# Patient Record
Sex: FEMALE | Race: WHITE | NOT HISPANIC OR LATINO | Employment: FULL TIME | URBAN - METROPOLITAN AREA
[De-identification: names, ages, dates, MRNs, and addresses within clinical notes are randomized per-mention and may not be internally consistent; named-entity substitution may affect disease eponyms.]

---

## 2017-08-01 ENCOUNTER — OFFICE VISIT (OUTPATIENT)
Dept: URGENT CARE | Facility: PHYSICIAN GROUP | Age: 62
End: 2017-08-01
Payer: COMMERCIAL

## 2017-08-01 VITALS
HEART RATE: 77 BPM | DIASTOLIC BLOOD PRESSURE: 86 MMHG | RESPIRATION RATE: 16 BRPM | BODY MASS INDEX: 23.04 KG/M2 | HEIGHT: 63 IN | TEMPERATURE: 97.7 F | OXYGEN SATURATION: 97 % | SYSTOLIC BLOOD PRESSURE: 144 MMHG | WEIGHT: 130 LBS

## 2017-08-01 DIAGNOSIS — Z72.0 TOBACCO ABUSE: ICD-10-CM

## 2017-08-01 DIAGNOSIS — B02.9 HERPES ZOSTER WITHOUT COMPLICATION: ICD-10-CM

## 2017-08-01 PROCEDURE — 99406 BEHAV CHNG SMOKING 3-10 MIN: CPT | Performed by: PHYSICIAN ASSISTANT

## 2017-08-01 PROCEDURE — 99204 OFFICE O/P NEW MOD 45 MIN: CPT | Mod: 25 | Performed by: PHYSICIAN ASSISTANT

## 2017-08-01 RX ORDER — HYDROCODONE BITARTRATE AND ACETAMINOPHEN 5; 325 MG/1; MG/1
TABLET ORAL
Qty: 10 TAB | Refills: 0 | Status: SHIPPED | OUTPATIENT
Start: 2017-08-01

## 2017-08-01 RX ORDER — VALACYCLOVIR HYDROCHLORIDE 1 G/1
1000 TABLET, FILM COATED ORAL 3 TIMES DAILY
Qty: 21 TAB | Refills: 0 | Status: SHIPPED | OUTPATIENT
Start: 2017-08-01

## 2017-08-01 ASSESSMENT — ENCOUNTER SYMPTOMS
HEADACHES: 0
FEVER: 0
CHILLS: 0
NEUROLOGICAL NEGATIVE: 1
MUSCULOSKELETAL NEGATIVE: 1
RESPIRATORY NEGATIVE: 1
BRUISES/BLEEDS EASILY: 0
CARDIOVASCULAR NEGATIVE: 1

## 2017-08-01 NOTE — PATIENT INSTRUCTIONS
Shingles  Shingles, which is also known as herpes zoster, is an infection that causes a painful skin rash and fluid-filled blisters. Shingles is not related to genital herpes, which is a sexually transmitted infection.     Shingles only develops in people who:  · Have had chickenpox.  · Have received the chickenpox vaccine. (This is rare.)  CAUSES  Shingles is caused by varicella-zoster virus (VZV). This is the same virus that causes chickenpox. After exposure to VZV, the virus stays in the body in an inactive (dormant) state. Shingles develops if the virus reactivates. This can happen many years after the initial exposure to VZV. It is not known what causes this virus to reactivate.  RISK FACTORS  People who have had chickenpox or received the chickenpox vaccine are at risk for shingles. Infection is more common in people who:  · Are older than age 50.  · Have a weakened defense (immune) system, such as those with HIV, AIDS, or cancer.  · Are taking medicines that weaken the immune system, such as transplant medicines.  · Are under great stress.  SYMPTOMS  Early symptoms of this condition include itching, tingling, and pain in an area on your skin. Pain may be described as burning, stabbing, or throbbing.  A few days or weeks after symptoms start, a painful red rash appears, usually on one side of the body in a bandlike or beltlike pattern. The rash eventually turns into fluid-filled blisters that break open, scab over, and dry up in about 2-3 weeks.  At any time during the infection, you may also develop:  · A fever.  · Chills.  · A headache.  · An upset stomach.  DIAGNOSIS  This condition is diagnosed with a skin exam. Sometimes, skin or fluid samples are taken from the blisters before a diagnosis is made. These samples are examined under a microscope or sent to a lab for testing.  TREATMENT  There is no specific cure for this condition. Your health care provider will probably prescribe medicines to help you  manage pain, recover more quickly, and avoid long-term problems. Medicines may include:  · Antiviral drugs.  · Anti-inflammatory drugs.  · Pain medicines.  If the area involved is on your face, you may be referred to a specialist, such as an eye doctor (ophthalmologist) or an ear, nose, and throat (ENT) doctor to help you avoid eye problems, chronic pain, or disability.  HOME CARE INSTRUCTIONS  Medicines  · Take medicines only as directed by your health care provider.  · Apply an anti-itch or numbing cream to the affected area as directed by your health care provider.  Blister and Rash Care  · Take a cool bath or apply cool compresses to the area of the rash or blisters as directed by your health care provider. This may help with pain and itching.  · Keep your rash covered with a loose bandage (dressing). Wear loose-fitting clothing to help ease the pain of material rubbing against the rash.  · Keep your rash and blisters clean with mild soap and cool water or as directed by your health care provider.  · Check your rash every day for signs of infection. These include redness, swelling, and pain that lasts or increases.  · Do not pick your blisters.  · Do not scratch your rash.  General Instructions  · Rest as directed by your health care provider.  · Keep all follow-up visits as directed by your health care provider. This is important.  · Until your blisters scab over, your infection can cause chickenpox in people who have never had it or been vaccinated against it. To prevent this from happening, avoid contact with other people, especially:  ¨ Babies.  ¨ Pregnant women.  ¨ Children who have eczema.  ¨ Elderly people who have transplants.  ¨ People who have chronic illnesses, such as leukemia or AIDS.  SEEK MEDICAL CARE IF:  · Your pain is not relieved with prescribed medicines.  · Your pain does not get better after the rash heals.  · Your rash looks infected. Signs of infection include redness, swelling, and pain  that lasts or increases.  SEEK IMMEDIATE MEDICAL CARE IF:  · The rash is on your face or nose.  · You have facial pain, pain around your eye area, or loss of feeling on one side of your face.  · You have ear pain or you have ringing in your ear.  · You have loss of taste.  · Your condition gets worse.     This information is not intended to replace advice given to you by your health care provider. Make sure you discuss any questions you have with your health care provider.     Document Released: 12/18/2006 Document Revised: 01/08/2016 Document Reviewed: 10/29/2015  [employer] Interactive Patient Education ©2016 [employer]

## 2017-08-01 NOTE — Clinical Note
August 1, 2017         Patient: Ángela Weller   YOB: 1955   Date of Visit: 8/1/2017           To Whom it May Concern:    Ángela Weller was seen in my clinic on 8/1/2017. She is to be off today and tomorrow to recover    If you have any questions or concerns, please don't hesitate to call.        Sincerely,           Yesy Russ PA-C  Electronically Signed

## 2017-08-01 NOTE — PROGRESS NOTES
"Subjective:      Ángela Weller is a 62 y.o. female who presents with Rash        Rash  Pertinent negatives include no fever.   Patient presents today for 3 day history of rash with 2 days of preceding pain without rash.  Patient states on Friday she just felt she had aggravated her back or that her aches and pains were \"old age pains\"   This persisted and subsequently she noticed rash on her back and now today there are two new spots.  The rash is very painful, burning.  She denies numbness or tingling.  She does admit to having some fatigue/malaise this week.  No fevers or chills.  Under a lot of stress in last few weeks.     Review of Systems   Constitutional: Positive for malaise/fatigue. Negative for fever and chills.   Respiratory: Negative.    Cardiovascular: Negative.    Musculoskeletal: Negative.    Skin: Positive for rash. Negative for itching.   Neurological: Negative.  Negative for headaches.   Endo/Heme/Allergies: Does not bruise/bleed easily.   All other systems reviewed and are negative.       PMH:  has no past medical history on file.  MEDS:   Current outpatient prescriptions:   •  Phenazopyridine HCl (AZO TABS PO), Take  by mouth., Disp: , Rfl:   •  valacyclovir (VALTREX) 1 GM Tab, Take 1 Tab by mouth 3 times a day., Disp: 21 Tab, Rfl: 0  •  hydrocodone-acetaminophen (NORCO) 5-325 MG Tab per tablet, 1-2 tablet at bedtime prn severe pain., Disp: 10 Tab, Rfl: 0  ALLERGIES: No Known Allergies  SURGHX: History reviewed. No pertinent past surgical history.  SOCHX:  reports that she has been smoking Cigarettes.  She has been smoking about 0.75 packs per day. She has never used smokeless tobacco. She reports that she drinks about 2.4 oz of alcohol per week. She reports that she does not use illicit drugs.  FH: Family history was reviewed, no pertinent findings to report     Objective:     /86 mmHg  Pulse 77  Temp(Src) 36.5 °C (97.7 °F)  Resp 16  Ht 1.6 m (5' 2.99\")  Wt 58.968 kg (130 lb)  BMI " 23.03 kg/m2  SpO2 97%     Physical Exam   Constitutional: She is oriented to person, place, and time. She appears well-developed and well-nourished. No distress.   Eyes: Conjunctivae and EOM are normal. Pupils are equal, round, and reactive to light.   Neck: Normal range of motion. Neck supple.   Cardiovascular: Normal rate and regular rhythm.    Pulmonary/Chest: Effort normal and breath sounds normal.   Neurological: She is alert and oriented to person, place, and time.   Skin: Skin is warm and dry. Rash noted.        Psychiatric: She has a normal mood and affect. Her behavior is normal.   Vitals reviewed.          Assessment/Plan:     1. Herpes zoster without complication  valacyclovir (VALTREX) 1 GM Tab    hydrocodone-acetaminophen (NORCO) 5-325 MG Tab per tablet       -consistent with zoster as above.   -detailed discussion and education of this dx with patient.   -valtrex rx.   -Hydrocodone for severe pain, bedtime only recommended.  Tylenol ES or NSAID during day prn.   -PCP follow up encouraged and recommended ot this.       Ready to quit: No  Counseling given: Yes    Tobacco cessation counseling was provided to the patient for a duration of three to seven minutes.  Tobacco effects, benefits of cessation and methods to use to achieve this goal were briefly discussed. An information handout was offered.        Supportive care, differential diagnoses, and indications for immediate follow-up discussed with patient.   Pathogenesis of diagnosis discussed including typical length and natural progression.   Instructed to return to clinic or nearest emergency department for any change in condition, further concerns, or worsening of symptoms.  Patient states understanding of the plan of care and discharge instructions.  Instructed to make an appointment, for follow up, with their primary care provider.      Yesy Russ PA-C

## 2017-08-01 NOTE — MR AVS SNAPSHOT
"        Ángela Weller   2017 8:30 AM   Office Visit   MRN: [id number]    Department:  [facility]   Dept Phone:  [phone]    Description:  Female : 1955   Provider:  Yesy Russ PA-C           Reason for Visit     Rash C/o rash on back, spreading to abd x1 wk.  PT states she felt the pain first about a week ago, but the rash didn't start till a few days ago.      Allergies as of 2017     No Known Allergies      You were diagnosed with     Herpes zoster without complication   [[id number]]         Vital Signs     Blood Pressure Pulse Temperature Respirations Height Weight    144/86 mmHg 77 36.5 °C (97.7 °F) 16 1.6 m (5' 2.99\") 58.968 kg (130 lb)    Body Mass Index Oxygen Saturation Smoking Status             23.03 kg/m2 97% Current Every Day Smoker         Basic Information     Date Of Birth Sex Race Ethnicity Preferred Language    1955 Female White Non- English      Health Maintenance        Date Due Completion Dates    IMM DTaP/Tdap/Td Vaccine (1 - Tdap) 1974 ---    PAP SMEAR 1976 ---    MAMMOGRAM 1995 ---    COLONOSCOPY 2005 ---    IMM ZOSTER VACCINE 2015 ---    IMM INFLUENZA (1) 2017 ---            Current Immunizations     No immunizations on file.      Below and/or attached are the medications your provider expects you to take. Review all of your home medications and newly ordered medications with your provider and/or pharmacist. Follow medication instructions as directed by your provider and/or pharmacist. Please keep your medication list with you and share with your provider. Update the information when medications are discontinued, doses are changed, or new medications (including over-the-counter products) are added; and carry medication information at all times in the event of emergency situations     Allergies:  No Known Allergies          Medications  Valid as of: 2017 -  8:55 AM    Generic Name Brand Name Tablet Size Instructions " for use    Hydrocodone-Acetaminophen (Tab) NORCO 5-325 MG 1-2 tablet at bedtime prn severe pain.        Phenazopyridine HCl   Take  by mouth.        ValACYclovir HCl (Tab) VALTREX 1 GM Take 1 Tab by mouth 3 times a day.        .                 Medicines prescribed today were sent to:     [facility][id number] - [address], [place] [address]    Phone: [phone] Fax: [phone]    Open 24 Hours?: No      Medication refill instructions:       If your prescription bottle indicates you have medication refills left, it is not necessary to call your provider’s office. Please contact your pharmacy and they will refill your medication.    If your prescription bottle indicates you do not have any refills left, you may request refills at any time through one of the following ways: The online [employer] system (except Urgent Care), by calling your provider’s office, or by asking your pharmacy to contact your provider’s office with a refill request. Medication refills are processed only during regular business hours and may not be available until the next business day. Your provider may request additional information or to have a follow-up visit with you prior to refilling your medication.   *Please Note: Medication refills are assigned a new Rx number when refilled electronically. Your pharmacy may indicate that no refills were authorized even though a new prescription for the same medication is available at the pharmacy. Please request the medicine by name with the pharmacy before contacting your provider for a refill.        Instructions    Shingles  Shingles, which is also known as herpes zoster, is an infection that causes a painful skin rash and fluid-filled blisters. Shingles is not related to genital herpes, which is a sexually transmitted infection.     Shingles only develops in people who:  · Have had chickenpox.  · Have received the chickenpox vaccine. (This is rare.)  CAUSES  Shingles  is caused by varicella-zoster virus (VZV). This is the same virus that causes chickenpox. After exposure to VZV, the virus stays in the body in an inactive (dormant) state. Shingles develops if the virus reactivates. This can happen many years after the initial exposure to VZV. It is not known what causes this virus to reactivate.  RISK FACTORS  People who have had chickenpox or received the chickenpox vaccine are at risk for shingles. Infection is more common in people who:  · Are older than age 50.  · Have a weakened defense (immune) system, such as those with HIV, AIDS, or cancer.  · Are taking medicines that weaken the immune system, such as transplant medicines.  · Are under great stress.  SYMPTOMS  Early symptoms of this condition include itching, tingling, and pain in an area on your skin. Pain may be described as burning, stabbing, or throbbing.  A few days or weeks after symptoms start, a painful red rash appears, usually on one side of the body in a bandlike or beltlike pattern. The rash eventually turns into fluid-filled blisters that break open, scab over, and dry up in about 2-3 weeks.  At any time during the infection, you may also develop:  · A fever.  · Chills.  · A headache.  · An upset stomach.  DIAGNOSIS  This condition is diagnosed with a skin exam. Sometimes, skin or fluid samples are taken from the blisters before a diagnosis is made. These samples are examined under a microscope or sent to a lab for testing.  TREATMENT  There is no specific cure for this condition. Your health care provider will probably prescribe medicines to help you manage pain, recover more quickly, and avoid long-term problems. Medicines may include:  · Antiviral drugs.  · Anti-inflammatory drugs.  · Pain medicines.  If the area involved is on your face, you may be referred to a specialist, such as an eye doctor (ophthalmologist) or an ear, nose, and throat (ENT) doctor to help you avoid eye problems, chronic pain, or  disability.  HOME CARE INSTRUCTIONS  Medicines  · Take medicines only as directed by your health care provider.  · Apply an anti-itch or numbing cream to the affected area as directed by your health care provider.  Blister and Rash Care  · Take a cool bath or apply cool compresses to the area of the rash or blisters as directed by your health care provider. This may help with pain and itching.  · Keep your rash covered with a loose bandage (dressing). Wear loose-fitting clothing to help ease the pain of material rubbing against the rash.  · Keep your rash and blisters clean with mild soap and cool water or as directed by your health care provider.  · Check your rash every day for signs of infection. These include redness, swelling, and pain that lasts or increases.  · Do not pick your blisters.  · Do not scratch your rash.  General Instructions  · Rest as directed by your health care provider.  · Keep all follow-up visits as directed by your health care provider. This is important.  · Until your blisters scab over, your infection can cause chickenpox in people who have never had it or been vaccinated against it. To prevent this from happening, avoid contact with other people, especially:  ¨ Babies.  ¨ Pregnant women.  ¨ Children who have eczema.  ¨ Elderly people who have transplants.  ¨ People who have chronic illnesses, such as leukemia or AIDS.  SEEK MEDICAL CARE IF:  · Your pain is not relieved with prescribed medicines.  · Your pain does not get better after the rash heals.  · Your rash looks infected. Signs of infection include redness, swelling, and pain that lasts or increases.  SEEK IMMEDIATE MEDICAL CARE IF:  · The rash is on your face or nose.  · You have facial pain, pain around your eye area, or loss of feeling on one side of your face.  · You have ear pain or you have ringing in your ear.  · You have loss of taste.  · Your condition gets worse.     This information is not intended to replace advice  given to you by your health care provider. Make sure you discuss any questions you have with your health care provider.     Document Released: 12/18/2006 Document Revised: 01/08/2016 Document Reviewed: 10/29/2015  [employer] Interactive Patient Education ©2016 [employer]            MyChart Status: Patient Declined        Quit Tobacco Information     Do you want to quit using tobacco?    Quitting tobacco decreases risks of cancer, heart and lung disease, increases life expectancy, improves sense of taste and smell, and increases spending money, among other benefits.    If you are thinking about quitting, we can help.  • [facility] Quit Tobacco Program: 907-130-6502  o Program occurs weekly for four weeks and includes pharmacist consultation on products to support quitting smoking or chewing tobacco. A provider referral is needed for pharmacist consultation.  • Tobacco Users Help Hotline: 1-097-QUIT-NOW (599-5168) or https://[place].quitlogix.org/  o Free, confidential telephone and online coaching for [place] residents. Sessions are designed on a schedule that is convenient for you. Eligible clients receive free nicotine replacement therapy.  • Nationally: www.smokefree.gov  o Information and professional assistance to support both immediate and long-term needs as you become, and remain, a non-smoker. Smokefree.gov allows you to choose the help that best fits your needs.

## 2021-03-03 DIAGNOSIS — Z23 NEED FOR VACCINATION: ICD-10-CM
